# Patient Record
Sex: FEMALE | Race: WHITE | NOT HISPANIC OR LATINO | Employment: OTHER | URBAN - METROPOLITAN AREA
[De-identification: names, ages, dates, MRNs, and addresses within clinical notes are randomized per-mention and may not be internally consistent; named-entity substitution may affect disease eponyms.]

---

## 2018-03-23 LAB
ALBUMIN SERPL BCP-MCNC: 4.3 G/DL (ref 3.5–5.7)
BASOPHILS # BLD AUTO: 0.1 X3/UL (ref 0–0.3)
BASOPHILS # BLD AUTO: 1 % (ref 0–2)
BUN SERPL-MCNC: 23 MG/DL (ref 7–25)
CALCIUM SERPL-MCNC: 10.4 MG/DL (ref 8.6–10.5)
CHLORIDE SERPL-SCNC: 106 MM/L (ref 98–107)
CO2 SERPL-SCNC: 27 MM/L (ref 21–31)
CREAT SERPL-MCNC: 1.13 MG/DL (ref 0.6–1.2)
DEPRECATED RDW RBC AUTO: 15.5 % (ref 11.5–14.5)
EGFR (HISTORICAL): 48 GFR
EGFR AFRICAN AMERICAN (HISTORICAL): 58 GFR
EOSINOPHIL # BLD AUTO: 0.2 X3/UL (ref 0–0.5)
EOSINOPHIL NFR BLD AUTO: 1.9 % (ref 0–5)
GLUCOSE (HISTORICAL): 101 MG/DL (ref 65–99)
HCT VFR BLD AUTO: 37.4 % (ref 37–47)
HGB BLD-MCNC: 12.5 G/DL (ref 12–16)
LYMPHOCYTES # BLD AUTO: 2.5 X3/UL (ref 1.2–4.2)
LYMPHOCYTES NFR BLD AUTO: 22 % (ref 20.5–51.1)
MCH RBC QN AUTO: 32.5 PG (ref 26–34)
MCHC RBC AUTO-ENTMCNC: 33.3 G/DL (ref 31–36)
MCV RBC AUTO: 97.6 FL (ref 81–99)
MONOCYTES # BLD AUTO: 0.7 X3/UL (ref 0–1)
MONOCYTES NFR BLD AUTO: 6 % (ref 1.7–12)
NEUTROPHILS # BLD AUTO: 7.9 X3/UL (ref 1.4–6.5)
NEUTS SEG NFR BLD AUTO: 69.1 % (ref 42.2–75.2)
OSMOLALITY, SERUM (HISTORICAL): 285 MOSM (ref 262–291)
PHOSPHATE SERPL-MCNC: 4.5 MG/DL (ref 3–5.5)
PLATELET # BLD AUTO: 338 X3/UL (ref 130–400)
PMV BLD AUTO: 9 FL (ref 8.6–11.7)
POTASSIUM SERPL-SCNC: 4.5 MM/L (ref 3.5–5.5)
PTH-INTACT SERPL-MCNC: 21 PG/ML (ref 12–88)
RBC # BLD AUTO: 3.83 X6/UL (ref 3.9–5.2)
SODIUM SERPL-SCNC: 141 MM/L (ref 134–143)
URIC ACID (HISTORICAL): 5.4 MG/DL (ref 2.3–7.6)
WBC # BLD AUTO: 11.5 X3/UL (ref 4.8–10.8)

## 2018-12-20 PROCEDURE — 1123F ACP DISCUSS/DSCN MKR DOCD: CPT | Performed by: PATHOLOGY

## 2019-01-15 RX ORDER — LEVOTHYROXINE SODIUM 175 UG/1
175 TABLET ORAL DAILY
COMMUNITY

## 2019-01-15 RX ORDER — MELATONIN
2000 DAILY
COMMUNITY

## 2019-01-15 RX ORDER — BENAZEPRIL HYDROCHLORIDE 40 MG/1
40 TABLET, FILM COATED ORAL 2 TIMES DAILY
COMMUNITY

## 2019-01-15 RX ORDER — ALLOPURINOL 100 MG/1
150 TABLET ORAL
COMMUNITY

## 2019-01-15 RX ORDER — METOPROLOL TARTRATE 50 MG/1
25 TABLET, FILM COATED ORAL EVERY 12 HOURS SCHEDULED
COMMUNITY

## 2019-01-15 RX ORDER — MULTIVIT-MIN/IRON/FOLIC ACID/K 18-600-40
CAPSULE ORAL
COMMUNITY

## 2019-01-15 RX ORDER — RANITIDINE 150 MG/1
150 TABLET ORAL 2 TIMES DAILY
COMMUNITY

## 2019-01-15 RX ORDER — OXYBUTYNIN CHLORIDE 5 MG/1
5 TABLET, EXTENDED RELEASE ORAL 3 TIMES DAILY
COMMUNITY

## 2019-01-16 ENCOUNTER — ANESTHESIA EVENT (OUTPATIENT)
Dept: PERIOP | Facility: HOSPITAL | Age: 69
End: 2019-01-16
Payer: MEDICARE

## 2019-01-16 NOTE — ANESTHESIA PREPROCEDURE EVALUATION
Review of Systems/Medical History  Patient summary reviewed  Chart reviewed  History of anesthetic complications     Cardiovascular  Hypertension ,    Pulmonary  Smoker ex-smoker  ,        GI/Hepatic    GERD ,             Endo/Other  History of thyroid disease ,      GYN       Hematology   Musculoskeletal    Arthritis     Neurology   Psychology         Lab Results   Component Value Date    WBC 11 5 (H) 03/23/2018    HGB 12 5 03/23/2018    HCT 37 4 03/23/2018    MCV 97 6 03/23/2018     03/23/2018     Lab Results   Component Value Date    CALCIUM 10 4 03/23/2018     03/23/2018    K 4 5 03/23/2018    CO2 27 03/23/2018     03/23/2018    BUN 23 03/23/2018    CREATININE 1 13 03/23/2018     No results found for: INR, PROTIME  No results found for: PTT    Physical Exam    Airway    Mallampati score: III  TM Distance: >3 FB  Neck ROM: full     Dental   No notable dental hx upper dentures,     Cardiovascular  Cardiovascular exam normal    Pulmonary  Pulmonary exam normal     Other Findings        Anesthesia Plan  ASA Score- 2     Anesthesia Type- IV sedation with anesthesia with ASA Monitors  Additional Monitors:   Airway Plan:     Comment: Plan discussed is MAC with GA as backup  I personally discussed risks and benefits to this anesthetic  All patient questions were answered  Pt agrees with anesthesia plan  Hx of awareness with prior Coloonoscopy  Plan Factors-    Induction- intravenous  Postoperative Plan- Plan for postoperative opioid use  Informed Consent- Anesthetic plan and risks discussed with patient  I personally reviewed this patient with the CRNA  Discussed and agreed on the Anesthesia Plan with the CRNA  Gege Bowen

## 2019-01-17 ENCOUNTER — HOSPITAL ENCOUNTER (OUTPATIENT)
Facility: HOSPITAL | Age: 69
Setting detail: OUTPATIENT SURGERY
Discharge: HOME | End: 2019-01-17
Attending: INTERNAL MEDICINE | Admitting: INTERNAL MEDICINE
Payer: MEDICARE

## 2019-01-17 ENCOUNTER — ANESTHESIA (OUTPATIENT)
Dept: PERIOP | Facility: HOSPITAL | Age: 69
End: 2019-01-17
Payer: MEDICARE

## 2019-01-17 VITALS
HEART RATE: 82 BPM | BODY MASS INDEX: 34.99 KG/M2 | HEIGHT: 65 IN | DIASTOLIC BLOOD PRESSURE: 59 MMHG | OXYGEN SATURATION: 94 % | SYSTOLIC BLOOD PRESSURE: 103 MMHG | TEMPERATURE: 98.7 F | RESPIRATION RATE: 20 BRPM | WEIGHT: 210 LBS

## 2019-01-17 DIAGNOSIS — K62.5 HEMORRHAGE OF ANUS AND RECTUM: ICD-10-CM

## 2019-01-17 DIAGNOSIS — R19.7 DIARRHEA: ICD-10-CM

## 2019-01-17 PROCEDURE — 88305 TISSUE EXAM BY PATHOLOGIST: CPT | Performed by: PATHOLOGY

## 2019-01-17 RX ORDER — PROPOFOL 10 MG/ML
INJECTION, EMULSION INTRAVENOUS AS NEEDED
Status: DISCONTINUED | OUTPATIENT
Start: 2019-01-17 | End: 2019-01-17 | Stop reason: SURG

## 2019-01-17 RX ORDER — MIDAZOLAM HYDROCHLORIDE 1 MG/ML
INJECTION INTRAMUSCULAR; INTRAVENOUS AS NEEDED
Status: DISCONTINUED | OUTPATIENT
Start: 2019-01-17 | End: 2019-01-17 | Stop reason: SURG

## 2019-01-17 RX ORDER — SODIUM CHLORIDE, SODIUM LACTATE, POTASSIUM CHLORIDE, CALCIUM CHLORIDE 600; 310; 30; 20 MG/100ML; MG/100ML; MG/100ML; MG/100ML
75 INJECTION, SOLUTION INTRAVENOUS CONTINUOUS
Status: DISCONTINUED | OUTPATIENT
Start: 2019-01-17 | End: 2019-01-17 | Stop reason: HOSPADM

## 2019-01-17 RX ORDER — SODIUM CHLORIDE, SODIUM LACTATE, POTASSIUM CHLORIDE, CALCIUM CHLORIDE 600; 310; 30; 20 MG/100ML; MG/100ML; MG/100ML; MG/100ML
INJECTION, SOLUTION INTRAVENOUS CONTINUOUS PRN
Status: DISCONTINUED | OUTPATIENT
Start: 2019-01-17 | End: 2019-01-17

## 2019-01-17 RX ORDER — SODIUM CHLORIDE, SODIUM LACTATE, POTASSIUM CHLORIDE, CALCIUM CHLORIDE 600; 310; 30; 20 MG/100ML; MG/100ML; MG/100ML; MG/100ML
125 INJECTION, SOLUTION INTRAVENOUS CONTINUOUS
Status: DISCONTINUED | OUTPATIENT
Start: 2019-01-17 | End: 2019-01-17 | Stop reason: HOSPADM

## 2019-01-17 RX ADMIN — PROPOFOL 30 MG: 10 INJECTION, EMULSION INTRAVENOUS at 09:47

## 2019-01-17 RX ADMIN — PROPOFOL 50 MG: 10 INJECTION, EMULSION INTRAVENOUS at 09:23

## 2019-01-17 RX ADMIN — PROPOFOL 50 MG: 10 INJECTION, EMULSION INTRAVENOUS at 09:26

## 2019-01-17 RX ADMIN — PROPOFOL 20 MG: 10 INJECTION, EMULSION INTRAVENOUS at 09:43

## 2019-01-17 RX ADMIN — PROPOFOL 30 MG: 10 INJECTION, EMULSION INTRAVENOUS at 09:41

## 2019-01-17 RX ADMIN — PROPOFOL 20 MG: 10 INJECTION, EMULSION INTRAVENOUS at 09:32

## 2019-01-17 RX ADMIN — PROPOFOL 30 MG: 10 INJECTION, EMULSION INTRAVENOUS at 09:46

## 2019-01-17 RX ADMIN — SODIUM CHLORIDE, SODIUM LACTATE, POTASSIUM CHLORIDE, AND CALCIUM CHLORIDE: .6; .31; .03; .02 INJECTION, SOLUTION INTRAVENOUS at 08:27

## 2019-01-17 RX ADMIN — PROPOFOL 40 MG: 10 INJECTION, EMULSION INTRAVENOUS at 09:29

## 2019-01-17 RX ADMIN — PROPOFOL 20 MG: 10 INJECTION, EMULSION INTRAVENOUS at 09:39

## 2019-01-17 RX ADMIN — PROPOFOL 150 MG: 10 INJECTION, EMULSION INTRAVENOUS at 09:21

## 2019-01-17 RX ADMIN — PROPOFOL 40 MG: 10 INJECTION, EMULSION INTRAVENOUS at 09:31

## 2019-01-17 RX ADMIN — PROPOFOL 30 MG: 10 INJECTION, EMULSION INTRAVENOUS at 09:45

## 2019-01-17 RX ADMIN — MIDAZOLAM HYDROCHLORIDE 2 MG: 1 INJECTION, SOLUTION INTRAMUSCULAR; INTRAVENOUS at 09:21

## 2019-01-17 RX ADMIN — PROPOFOL 30 MG: 10 INJECTION, EMULSION INTRAVENOUS at 09:36

## 2019-01-17 RX ADMIN — PROPOFOL 20 MG: 10 INJECTION, EMULSION INTRAVENOUS at 09:34

## 2019-01-17 NOTE — ANESTHESIA POSTPROCEDURE EVALUATION
Post-Op Assessment Note      CV Status:  Stable    Mental Status:  Alert    Hydration Status:  Stable    PONV Controlled:  Controlled    Airway Patency:  Patent    Post Op Vitals Reviewed: Yes          Staff: CRNA           BP  103/59   Temp   98 7   Pulse  67   Resp   20   SpO2   93

## 2019-01-17 NOTE — OP NOTE
OPERATIVE REPORT  PATIENT NAME: Sandra Quiroz    :  1950  MRN: [id number]  Pt Location: [address] [place] [address] GI ROOM 01    SURGERY DATE: 2019    Surgeon(s) and Role:     * Arsenio Cortes MD - Primary    Preop Diagnosis:  Diarrhea [R19 7]  Hemorrhage of anus and rectum [K62 5]    Post-Op Diagnosis Codes:     * Diarrhea [R19 7]     * Hemorrhage of anus and rectum [K62 5]    Procedure(s) (LRB):   colonoscopy with multple polpectomies (N/A)    Specimen(s):  ID Type Source Tests Collected by Time Destination   1 : polyp Tissue Large Intestine, Cecum TISSUE EXAM Arsenio Cortes MD 2019 9289    2 : polyp Tissue Large Intestine, Left/Descending Colon TISSUE Marleni Dm Arsenio Cortes MD 2019 0944    3 : poylp Tissue Large Intestine, Sigmoid Colon TISSUE EXAM Arsenio Cortes MD 2019 3997        Estimated Blood Loss:   Minimal    Drains:       Anesthesia Type:   IV Sedation with Anesthesia    Operative Indications:  Diarrhea [R19 7]  Hemorrhage of anus and rectum [K62 5]  History of colonic polyps  Operative Findings: Three sessile and small colonic polyps ablated removed by hot biopsy forceps  One from cecum, 1 from descending colon, 1 from sigmoid colon  Diverticulosis without diverticulitis  Internal hemorrhoids seen without bleeding  Complications:   None    Procedure and Technique:  Patient was identified in the endoscopy suite  Informed consent was obtained he was placed in the left lateral position and a PC of 180 scope was used to examine the entire colon up to cecum  Entire colon was examined thoroughly upon insertion and removal of the scope  Colonoscopy withdrawal time was 14 min from the cecum  Prep was adequate  Patient tolerated procedure well there were no complications  Findings  One cecal polyp sessile, 1 descending colon polyp sessile, 1 flat polyp from sigmoid colon were all ablated removed by hot biopsy  Forceps  Diverticulosis without diverticulitis    Internal hemorrhoids seen without  Bleeding  Recommendations  Check pathology  Local treatment of hemorrhoids  Repeat colonoscopy in 3-5 years unless symptomatic     I was present for the entire procedure    Patient Disposition:  APU    SIGNATURE: Wan Ha MD  DATE: January 17, 2019  TIME: 9:50 AM

## 2019-01-24 ENCOUNTER — TRANSCRIBE ORDERS (OUTPATIENT)
Dept: ADMINISTRATIVE | Facility: HOSPITAL | Age: 69
End: 2019-01-24

## 2019-01-24 DIAGNOSIS — Z12.39 BREAST SCREENING: Primary | ICD-10-CM

## 2019-02-07 ENCOUNTER — HOSPITAL ENCOUNTER (OUTPATIENT)
Dept: MAMMOGRAPHY | Facility: HOSPITAL | Age: 69
Discharge: HOME | End: 2019-02-07
Attending: SPECIALIST
Payer: MEDICARE

## 2019-02-07 VITALS — WEIGHT: 205 LBS | BODY MASS INDEX: 34.16 KG/M2 | HEIGHT: 65 IN

## 2019-02-07 DIAGNOSIS — Z12.39 BREAST SCREENING: ICD-10-CM

## 2019-02-07 PROCEDURE — 77067 SCR MAMMO BI INCL CAD: CPT

## 2019-02-07 PROCEDURE — 77063 BREAST TOMOSYNTHESIS BI: CPT

## 2020-03-13 ENCOUNTER — TRANSCRIBE ORDERS (OUTPATIENT)
Dept: ADMINISTRATIVE | Facility: HOSPITAL | Age: 70
End: 2020-03-13

## 2020-03-13 DIAGNOSIS — Z12.31 ENCOUNTER FOR SCREENING MAMMOGRAM FOR MALIGNANT NEOPLASM OF BREAST: Primary | ICD-10-CM

## 2020-03-17 ENCOUNTER — HOSPITAL ENCOUNTER (OUTPATIENT)
Dept: MAMMOGRAPHY | Facility: HOSPITAL | Age: 70
Discharge: HOME | End: 2020-03-17
Attending: SPECIALIST
Payer: MEDICARE

## 2020-03-17 VITALS — BODY MASS INDEX: 34.49 KG/M2 | WEIGHT: 207 LBS | HEIGHT: 65 IN

## 2020-03-17 DIAGNOSIS — Z12.31 ENCOUNTER FOR SCREENING MAMMOGRAM FOR MALIGNANT NEOPLASM OF BREAST: ICD-10-CM

## 2020-03-17 PROCEDURE — 77063 BREAST TOMOSYNTHESIS BI: CPT

## 2020-03-17 PROCEDURE — 77067 SCR MAMMO BI INCL CAD: CPT

## 2022-05-05 ENCOUNTER — HOSPITAL ENCOUNTER (EMERGENCY)
Facility: HOSPITAL | Age: 72
Discharge: HOME | End: 2022-05-06
Attending: EMERGENCY MEDICINE | Admitting: EMERGENCY MEDICINE
Payer: MEDICARE

## 2022-05-05 ENCOUNTER — APPOINTMENT (EMERGENCY)
Dept: RADIOLOGY | Facility: HOSPITAL | Age: 72
End: 2022-05-05
Payer: MEDICARE

## 2022-05-05 DIAGNOSIS — U07.1 COVID-19: Primary | ICD-10-CM

## 2022-05-05 LAB
ALBUMIN SERPL BCP-MCNC: 3.9 G/DL (ref 3.5–5)
ALP SERPL-CCNC: 51 U/L (ref 34–104)
ALT SERPL W P-5'-P-CCNC: 10 U/L (ref 7–52)
ANION GAP SERPL CALCULATED.3IONS-SCNC: 11 MMOL/L (ref 4–13)
AST SERPL W P-5'-P-CCNC: 28 U/L (ref 13–39)
BASOPHILS # BLD AUTO: 0.05 THOUSANDS/ΜL (ref 0–0.1)
BASOPHILS NFR BLD AUTO: 1 % (ref 0–1)
BILIRUB SERPL-MCNC: 0.46 MG/DL (ref 0.2–1)
BUN SERPL-MCNC: 36 MG/DL (ref 5–25)
CALCIUM SERPL-MCNC: 10.1 MG/DL (ref 8.4–10.2)
CARDIAC TROPONIN I PNL SERPL HS: 7 NG/L
CHLORIDE SERPL-SCNC: 101 MMOL/L (ref 96–108)
CO2 SERPL-SCNC: 24 MMOL/L (ref 21–32)
CREAT SERPL-MCNC: 1.55 MG/DL (ref 0.6–1.3)
EOSINOPHIL # BLD AUTO: 0.11 THOUSAND/ΜL (ref 0–0.61)
EOSINOPHIL NFR BLD AUTO: 2 % (ref 0–6)
ERYTHROCYTE [DISTWIDTH] IN BLOOD BY AUTOMATED COUNT: 13.5 % (ref 11.6–15.1)
FLUAV RNA RESP QL NAA+PROBE: NEGATIVE
FLUBV RNA RESP QL NAA+PROBE: NEGATIVE
GFR SERPL CREATININE-BSD FRML MDRD: 32 ML/MIN/1.73SQ M
GLUCOSE SERPL-MCNC: 109 MG/DL (ref 65–140)
HCT VFR BLD AUTO: 41 % (ref 34.8–46.1)
HGB BLD-MCNC: 13 G/DL (ref 11.5–15.4)
IMM GRANULOCYTES # BLD AUTO: 0.02 THOUSAND/UL (ref 0–0.2)
IMM GRANULOCYTES NFR BLD AUTO: 0 % (ref 0–2)
LYMPHOCYTES # BLD AUTO: 1.51 THOUSANDS/ΜL (ref 0.6–4.47)
LYMPHOCYTES NFR BLD AUTO: 23 % (ref 14–44)
MCH RBC QN AUTO: 31.3 PG (ref 26.8–34.3)
MCHC RBC AUTO-ENTMCNC: 31.7 G/DL (ref 31.4–37.4)
MCV RBC AUTO: 99 FL (ref 82–98)
MONOCYTES # BLD AUTO: 0.71 THOUSAND/ΜL (ref 0.17–1.22)
MONOCYTES NFR BLD AUTO: 11 % (ref 4–12)
NEUTROPHILS # BLD AUTO: 4.2 THOUSANDS/ΜL (ref 1.85–7.62)
NEUTS SEG NFR BLD AUTO: 63 % (ref 43–75)
NRBC BLD AUTO-RTO: 0 /100 WBCS
PLATELET # BLD AUTO: 206 THOUSANDS/UL (ref 149–390)
PMV BLD AUTO: 9.7 FL (ref 8.9–12.7)
POTASSIUM SERPL-SCNC: 4 MMOL/L (ref 3.5–5.3)
PROT SERPL-MCNC: 7.4 G/DL (ref 6.4–8.4)
RBC # BLD AUTO: 4.16 MILLION/UL (ref 3.81–5.12)
RSV RNA RESP QL NAA+PROBE: NEGATIVE
SARS-COV-2 RNA RESP QL NAA+PROBE: POSITIVE
SODIUM SERPL-SCNC: 136 MMOL/L (ref 135–147)
WBC # BLD AUTO: 6.6 THOUSAND/UL (ref 4.31–10.16)

## 2022-05-05 PROCEDURE — 36415 COLL VENOUS BLD VENIPUNCTURE: CPT | Performed by: EMERGENCY MEDICINE

## 2022-05-05 PROCEDURE — 0241U HB NFCT DS VIR RESP RNA 4 TRGT: CPT | Performed by: EMERGENCY MEDICINE

## 2022-05-05 PROCEDURE — 93005 ELECTROCARDIOGRAM TRACING: CPT

## 2022-05-05 PROCEDURE — 80053 COMPREHEN METABOLIC PANEL: CPT | Performed by: EMERGENCY MEDICINE

## 2022-05-05 PROCEDURE — 96361 HYDRATE IV INFUSION ADD-ON: CPT

## 2022-05-05 PROCEDURE — 71045 X-RAY EXAM CHEST 1 VIEW: CPT

## 2022-05-05 PROCEDURE — 96360 HYDRATION IV INFUSION INIT: CPT

## 2022-05-05 PROCEDURE — 85025 COMPLETE CBC W/AUTO DIFF WBC: CPT | Performed by: EMERGENCY MEDICINE

## 2022-05-05 PROCEDURE — 99284 EMERGENCY DEPT VISIT MOD MDM: CPT

## 2022-05-05 PROCEDURE — 84484 ASSAY OF TROPONIN QUANT: CPT | Performed by: EMERGENCY MEDICINE

## 2022-05-05 RX ORDER — SODIUM CHLORIDE 9 MG/ML
3 INJECTION INTRAVENOUS
Status: DISCONTINUED | OUTPATIENT
Start: 2022-05-05 | End: 2022-05-06 | Stop reason: HOSPADM

## 2022-05-05 RX ORDER — ACETAMINOPHEN 325 MG/1
975 TABLET ORAL ONCE
Status: COMPLETED | OUTPATIENT
Start: 2022-05-05 | End: 2022-05-05

## 2022-05-05 RX ADMIN — ACETAMINOPHEN 975 MG: 325 TABLET ORAL at 22:49

## 2022-05-05 RX ADMIN — SODIUM CHLORIDE 1000 ML: 0.9 INJECTION, SOLUTION INTRAVENOUS at 22:49

## 2022-05-06 VITALS
RESPIRATION RATE: 16 BRPM | SYSTOLIC BLOOD PRESSURE: 109 MMHG | DIASTOLIC BLOOD PRESSURE: 54 MMHG | HEART RATE: 67 BPM | OXYGEN SATURATION: 93 % | TEMPERATURE: 98.1 F

## 2022-05-06 LAB
2HR DELTA HS TROPONIN: -1 NG/L
ANION GAP SERPL CALCULATED.3IONS-SCNC: 9 MMOL/L (ref 4–13)
ATRIAL RATE: 71 BPM
ATRIAL RATE: 72 BPM
BUN SERPL-MCNC: 36 MG/DL (ref 5–25)
CALCIUM SERPL-MCNC: 9.4 MG/DL (ref 8.4–10.2)
CARDIAC TROPONIN I PNL SERPL HS: 6 NG/L
CHLORIDE SERPL-SCNC: 103 MMOL/L (ref 96–108)
CO2 SERPL-SCNC: 23 MMOL/L (ref 21–32)
CREAT SERPL-MCNC: 1.35 MG/DL (ref 0.6–1.3)
GFR SERPL CREATININE-BSD FRML MDRD: 38 ML/MIN/1.73SQ M
GLUCOSE SERPL-MCNC: 102 MG/DL (ref 65–140)
P AXIS: 64 DEGREES
P AXIS: 65 DEGREES
POTASSIUM SERPL-SCNC: 3.9 MMOL/L (ref 3.5–5.3)
PR INTERVAL: 180 MS
PR INTERVAL: 204 MS
QRS AXIS: 50 DEGREES
QRS AXIS: 55 DEGREES
QRSD INTERVAL: 78 MS
QRSD INTERVAL: 80 MS
QT INTERVAL: 394 MS
QT INTERVAL: 422 MS
QTC INTERVAL: 428 MS
QTC INTERVAL: 462 MS
SODIUM SERPL-SCNC: 135 MMOL/L (ref 135–147)
T WAVE AXIS: 43 DEGREES
T WAVE AXIS: 51 DEGREES
VENTRICULAR RATE: 71 BPM
VENTRICULAR RATE: 72 BPM

## 2022-05-06 PROCEDURE — 84484 ASSAY OF TROPONIN QUANT: CPT | Performed by: EMERGENCY MEDICINE

## 2022-05-06 PROCEDURE — 93010 ELECTROCARDIOGRAM REPORT: CPT | Performed by: INTERNAL MEDICINE

## 2022-05-06 PROCEDURE — 36415 COLL VENOUS BLD VENIPUNCTURE: CPT | Performed by: EMERGENCY MEDICINE

## 2022-05-06 PROCEDURE — 93005 ELECTROCARDIOGRAM TRACING: CPT

## 2022-05-06 PROCEDURE — 80048 BASIC METABOLIC PNL TOTAL CA: CPT | Performed by: EMERGENCY MEDICINE

## 2022-05-06 PROCEDURE — 99282 EMERGENCY DEPT VISIT SF MDM: CPT | Performed by: EMERGENCY MEDICINE

## 2022-05-07 NOTE — ED PROVIDER NOTES
History  Chief Complaint   Patient presents with    Fever - 9 weeks to 74 years     Pt presents ambulatory c/o fevers Tmax 101, lightheadedness, sinus pressure, sore throat x 2 days  Pt endorses recent sick contact w/ COVID+ grandson  Pt took home antigen test which yielded negative result   URI     Patient is a 68-year-old female with history of CKD that presents for evaluation of lightheadedness, cough likely COVID-19  Patient says that her grandson who she presented with just tested positive for COVID-19  She says over the past 48 hours she has had nonproductive cough, rhinorrhea, congestion and intermittent lightheadedness  She took her blood pressure several times a day in the systolics were in the 90Q  Patient has been able to handle p o  she otherwise denies nausea vomiting, chest pain, dyspnea, abdominal pain  Patient with previous history of COVID-19 infection last year  She has not been taking anything for her symptoms  She is unvaccinated  She denies abdominal pain  Symptoms are currently mild and improving  She is actually asymptomatic this time  Prior to Admission Medications   Prescriptions Last Dose Informant Patient Reported? Taking?    COLLAGEN PO   Yes No   Sig: Take by mouth   Docusate Calcium ([employer] STOOL SOFTENER PO)   Yes No   Sig: Take by mouth   Multiple Vitamins-Minerals (MULTI FOR HER 50+) CAPS   Yes No   Sig: Take by mouth   allopurinol (ZYLOPRIM) 100 mg tablet   Yes No   Sig: Take 150 mg by mouth   benazepril (LOTENSIN) 40 MG tablet   Yes No   Sig: Take 40 mg by mouth 2 (two) times a day   carbidopa-levodopa (SINEMET)  mg per tablet   Yes No   Sig: Take 1 tablet by mouth 4 (four) times a day   cholecalciferol (VITAMIN D3) 1,000 units tablet   Yes No   Sig: Take 2,000 Units by mouth daily   levothyroxine 175 mcg tablet   Yes No   Sig: Take 175 mcg by mouth daily   metoprolol tartrate (LOPRESSOR) 50 mg tablet   Yes No   Sig: Take 25 mg by mouth every 12 (twelve) hours   oxybutynin (DITROPAN-XL) 5 mg 24 hr tablet   Yes No   Sig: Take 5 mg by mouth 3 (three) times a day   ranitidine (ZANTAC) 150 mg tablet   Yes No   Sig: Take 150 mg by mouth 2 (two) times a day      Facility-Administered Medications: None       Past Medical History:   Diagnosis Date    Arthritis     Chronic kidney disease     Disease of thyroid gland     GERD (gastroesophageal reflux disease)     Hypertension        Past Surgical History:   Procedure Laterality Date    ABLATION SAPHENOUS VEIN W/ RFA      ALLERGEN BIRCH (HISTORICAL)      COLONOSCOPY N/A 1/17/2019    Procedure:  colonoscopy with multple polpectomies;  Surgeon: Travis Ulloa MD;  Location: [facility] GI LAB; Service: Gastroenterology    HYSTERECTOMY  1985    JOINT REPLACEMENT      ROTATOR CUFF REPAIR      SALPINGOOPHORECTOMY Left     THYROID LOBECTOMY      TOTAL HIP ARTHROPLASTY Bilateral        Family History   Problem Relation Age of Onset    Heart disease Mother     Heart disease Father     Breast cancer Sister 50    No Known Problems Daughter     No Known Problems Maternal Grandmother     No Known Problems Paternal Grandmother     No Known Problems Paternal Aunt      I have reviewed and agree with the history as documented  E-Cigarette/Vaping     E-Cigarette/Vaping Substances     Social History     Tobacco Use    Smoking status: Former Smoker     Quit date: 7/2/2018     Years since quitting: 3 8    Smokeless tobacco: Never Used   Substance Use Topics    Alcohol use: No    Drug use: No       Review of Systems   Constitutional: Negative for fever  HENT: Positive for congestion  Negative for sore throat  Respiratory: Positive for cough  Negative for shortness of breath  Cardiovascular: Negative for chest pain  Gastrointestinal: Negative for abdominal pain  Genitourinary: Negative for dysuria  Musculoskeletal: Negative for back pain  Skin: Negative for rash  Neurological: Positive for light-headedness  Psychiatric/Behavioral: Negative for agitation  All other systems reviewed and are negative  Physical Exam  Physical Exam  Vitals reviewed  Constitutional:       General: She is not in acute distress  Appearance: She is well-developed  HENT:      Head: Normocephalic  Eyes:      Pupils: Pupils are equal, round, and reactive to light  Cardiovascular:      Rate and Rhythm: Normal rate and regular rhythm  Heart sounds: Normal heart sounds  Pulmonary:      Effort: Pulmonary effort is normal       Breath sounds: Normal breath sounds  Abdominal:      General: Bowel sounds are normal  There is no distension  Palpations: Abdomen is soft  Tenderness: There is no abdominal tenderness  There is no guarding  Musculoskeletal:         General: No tenderness or deformity  Normal range of motion  Cervical back: Normal range of motion and neck supple  Skin:     General: Skin is warm and dry  Capillary Refill: Capillary refill takes less than 2 seconds  Neurological:      Mental Status: She is alert and oriented to person, place, and time  Cranial Nerves: No cranial nerve deficit  Sensory: No sensory deficit  Psychiatric:         Behavior: Behavior normal          Thought Content:  Thought content normal          Judgment: Judgment normal          Vital Signs  ED Triage Vitals [05/05/22 2226]   Temperature Pulse Respirations Blood Pressure SpO2   98 1 °F (36 7 °C) 73 18 139/67 96 %      Temp Source Heart Rate Source Patient Position - Orthostatic VS BP Location FiO2 (%)   Oral Monitor Sitting Left arm --      Pain Score       No Pain           Vitals:    05/06/22 0115 05/06/22 0130 05/06/22 0145 05/06/22 0200   BP:  109/54     Pulse: 70 69 73 67   Patient Position - Orthostatic VS:             Visual Acuity      ED Medications  Medications   acetaminophen (TYLENOL) tablet 975 mg (975 mg Oral Given 5/5/22 0111)   sodium chloride 0 9 % bolus 1,000 mL (0 mL Intravenous Stopped 5/6/22 0041)       Diagnostic Studies  Results Reviewed     Procedure Component Value Units Date/Time    HS Troponin I 2hr [[id number]]  (Normal) Collected: 05/06/22 0106    Lab Status: Final result Specimen: Blood from Arm, Right Updated: 05/06/22 0136     hs TnI 2hr 6 ng/L      Delta 2hr hsTnI -1 ng/L     Basic metabolic panel [[id number]]  (Abnormal) Collected: 05/06/22 0106    Lab Status: Final result Specimen: Blood from Arm, Right Updated: 05/06/22 0131     Sodium 135 mmol/L      Potassium 3 9 mmol/L      Chloride 103 mmol/L      CO2 23 mmol/L      ANION GAP 9 mmol/L      BUN 36 mg/dL      Creatinine 1 35 mg/dL      Glucose 102 mg/dL      Calcium 9 4 mg/dL      eGFR 38 ml/min/1 73sq m     Narrative:      [facility] guidelines for Chronic Kidney Disease (CKD):     Stage 1 with normal or high GFR (GFR > 90 mL/min/1 73 square meters)    Stage 2 Mild CKD (GFR = 60-89 mL/min/1 73 square meters)    Stage 3A Moderate CKD (GFR = 45-59 mL/min/1 73 square meters)    Stage 3B Moderate CKD (GFR = 30-44 mL/min/1 73 square meters)    Stage 4 Severe CKD (GFR = 15-29 mL/min/1 73 square meters)    Stage 5 End Stage CKD (GFR <15 mL/min/1 73 square meters)  Note: GFR calculation is accurate only with a steady state creatinine    COVID/FLU/RSV [[id number]]  (Abnormal) Collected: 05/05/22 2249    Lab Status: Final result Specimen: Nares from Nose Updated: 05/05/22 2332     SARS-CoV-2 Positive     INFLUENZA A PCR Negative     INFLUENZA B PCR Negative     RSV PCR Negative    Narrative:      FOR PEDIATRIC PATIENTS - copy/paste COVID Guidelines URL to browser: [email or web address]  [email or web address]    SARS-CoV-2 assay is a Nucleic Acid Amplification assay intended for the  qualitative detection of nucleic acid from SARS-CoV-2 in nasopharyngeal  swabs  Results are for the presumptive identification of SARS-CoV-2 RNA      Positive results are indicative of infection with SARS-CoV-2, the virus  causing COVID-19, but do not rule out bacterial infection or co-infection  with other viruses  Laboratories within the [place] and its  territories are required to report all positive results to the appropriate  public health authorities  Negative results do not preclude SARS-CoV-2  infection and should not be used as the sole basis for treatment or other  patient management decisions  Negative results must be combined with  clinical observations, patient history, and epidemiological information  This test has not been FDA cleared or approved  This test has been authorized by FDA under an Emergency Use Authorization  (EUA)  This test is only authorized for the duration of time the  declaration that circumstances exist justifying the authorization of the  emergency use of an in vitro diagnostic tests for detection of SARS-CoV-2  virus and/or diagnosis of COVID-19 infection under section 564(b)(1) of  the Act, 21 U  S C  832ZQH-7(C)(0), unless the authorization is terminated  or revoked sooner  The test has been validated but independent review by FDA  and CLIA is pending  Test performed using [employer] GeneXpert: This RT-PCR assay targets N2,  a region unique to SARS-CoV-2  A conserved region in the E-gene was chosen  for pan-Sarbecovirus detection which includes SARS-CoV-2      HS Troponin 0hr (reflex protocol) [[id number]]  (Normal) Collected: 05/05/22 2249    Lab Status: Final result Specimen: Blood from Arm, Right Updated: 05/05/22 2320     hs TnI 0hr 7 ng/L     Comprehensive metabolic panel [[id number]]  (Abnormal) Collected: 05/05/22 2249    Lab Status: Final result Specimen: Blood from Arm, Right Updated: 05/05/22 2316     Sodium 136 mmol/L      Potassium 4 0 mmol/L      Chloride 101 mmol/L      CO2 24 mmol/L      ANION GAP 11 mmol/L      BUN 36 mg/dL      Creatinine 1 55 mg/dL      Glucose 109 mg/dL      Calcium 10 1 mg/dL      AST 28 U/L      ALT 10 U/L      Alkaline Phosphatase 51 U/L      Total Protein 7 4 g/dL      Albumin 3 9 g/dL      Total Bilirubin 0 46 mg/dL      eGFR 32 ml/min/1 73sq m     Narrative:      [facility] guidelines for Chronic Kidney Disease (CKD):     Stage 1 with normal or high GFR (GFR > 90 mL/min/1 73 square meters)    Stage 2 Mild CKD (GFR = 60-89 mL/min/1 73 square meters)    Stage 3A Moderate CKD (GFR = 45-59 mL/min/1 73 square meters)    Stage 3B Moderate CKD (GFR = 30-44 mL/min/1 73 square meters)    Stage 4 Severe CKD (GFR = 15-29 mL/min/1 73 square meters)    Stage 5 End Stage CKD (GFR <15 mL/min/1 73 square meters)  Note: GFR calculation is accurate only with a steady state creatinine    CBC and differential [[id number]]  (Abnormal) Collected: 05/05/22 2249    Lab Status: Final result Specimen: Blood from Arm, Right Updated: 05/05/22 2255     WBC 6 60 Thousand/uL      RBC 4 16 Million/uL      Hemoglobin 13 0 g/dL      Hematocrit 41 0 %      MCV 99 fL      MCH 31 3 pg      MCHC 31 7 g/dL      RDW 13 5 %      MPV 9 7 fL      Platelets 700 Thousands/uL      nRBC 0 /100 WBCs      Neutrophils Relative 63 %      Immat GRANS % 0 %      Lymphocytes Relative 23 %      Monocytes Relative 11 %      Eosinophils Relative 2 %      Basophils Relative 1 %      Neutrophils Absolute 4 20 Thousands/µL      Immature Grans Absolute 0 02 Thousand/uL      Lymphocytes Absolute 1 51 Thousands/µL      Monocytes Absolute 0 71 Thousand/µL      Eosinophils Absolute 0 11 Thousand/µL      Basophils Absolute 0 05 Thousands/µL                  X-ray chest 1 view portable   Final Result by Moreno Moralez MD (05/06 1013)      Mild right basilar discoid atelectasis  No focal infiltrate                   Workstation performed: [id number]                    Procedures  Procedures         ED Course  ED Course as of 05/07/22 Marija Sports May 06, 2022   0138 Creatinine(!): 1 35  Improved                                SBIRT 20yo+      Most Recent Value SBIRT (25 yo +)    In order to provide better care to our patients, we are screening all of our patients for alcohol and drug use  Would it be okay to ask you these screening questions? No Filed at: 05/05/2022 2257                    [facility]  Number of Diagnoses or Management Options  COVID-19  Diagnosis management comments: Patient is a 70-year-old female presents for evaluation of cough rhinorrhea, congestion, lightheadedness secondary to COVID-19  Patient was normotensive throughout her time here in the emergency department  Noted to have increased creatinine likely secondary to dehydration, after 1 L IV fluids blood work did improve  Patient was advised on vitamin-C/vitamin-D/zinc use and strict return precautions  Disposition  Final diagnoses:   [id number]     Time reflects when diagnosis was documented in both MDM as applicable and the Disposition within this note     Time User Action Codes Description Comment    5/6/2022  1:39 AM Joanna Mad Add [U07 1] COVID-19       ED Disposition     ED Disposition Condition Date/Time Comment    Discharge Stable Fri May 6, 2022  1:39 AM Ligia Doty discharge to home/self care              Follow-up Information     Follow up With Specialties Details Why Contact Info Additional [address] Emergency Department Emergency Medicine  If symptoms worsen [address]  [place] [address] [phone] Replaced by [facility] Emergency Department, [address] [place] [address] Maria Esther Mata, [address] [facility], DO Family Medicine Schedule an appointment as soon as possible for a visit in 3 days For symptom recheck [facility] [address]  [phone]             Discharge Medication List as of 5/6/2022  1:39 AM      CONTINUE these medications which have NOT CHANGED    Details   allopurinol (ZYLOPRIM) 100 mg tablet Take 150 mg by mouth, Historical Med      benazepril (LOTENSIN) 40 MG tablet Take 40 mg by mouth 2 (two) times a day, Historical Med      carbidopa-levodopa (SINEMET)  mg per tablet Take 1 tablet by mouth 4 (four) times a day, Historical Med      cholecalciferol (VITAMIN D3) 1,000 units tablet Take 2,000 Units by mouth daily, Historical Med      COLLAGEN PO Take by mouth, Historical Med      Docusate Calcium ([employer] STOOL SOFTENER PO) Take by mouth, Historical Med      levothyroxine 175 mcg tablet Take 175 mcg by mouth daily, Historical Med      metoprolol tartrate (LOPRESSOR) 50 mg tablet Take 25 mg by mouth every 12 (twelve) hours, Historical Med      Multiple Vitamins-Minerals (MULTI FOR HER 50+) CAPS Take by mouth, Historical Med      oxybutynin (DITROPAN-XL) 5 mg 24 hr tablet Take 5 mg by mouth 3 (three) times a day, Historical Med      ranitidine (ZANTAC) 150 mg tablet Take 150 mg by mouth 2 (two) times a day, Historical Med             No discharge procedures on file      PDMP Review     None          ED Provider  Electronically Signed by           Lor Ceballos MD  05/07/22 0138

## 2025-02-27 ENCOUNTER — APPOINTMENT (OUTPATIENT)
Dept: RADIOLOGY | Facility: CLINIC | Age: 75
End: 2025-02-27
Payer: MEDICARE

## 2025-02-27 ENCOUNTER — OFFICE VISIT (OUTPATIENT)
Dept: URGENT CARE | Facility: CLINIC | Age: 75
End: 2025-02-27
Payer: MEDICARE

## 2025-02-27 VITALS
TEMPERATURE: 98.5 F | DIASTOLIC BLOOD PRESSURE: 65 MMHG | OXYGEN SATURATION: 98 % | SYSTOLIC BLOOD PRESSURE: 135 MMHG | HEART RATE: 86 BPM | RESPIRATION RATE: 18 BRPM

## 2025-02-27 DIAGNOSIS — M79.645 PAIN OF LEFT THUMB: Primary | ICD-10-CM

## 2025-02-27 DIAGNOSIS — M79.645 PAIN OF LEFT THUMB: ICD-10-CM

## 2025-02-27 PROCEDURE — 99204 OFFICE O/P NEW MOD 45 MIN: CPT

## 2025-02-27 PROCEDURE — 73140 X-RAY EXAM OF FINGER(S): CPT

## 2025-02-27 PROCEDURE — G0463 HOSPITAL OUTPT CLINIC VISIT: HCPCS

## 2025-02-27 NOTE — PROGRESS NOTES
"  [facility]        NAME: Brittany Webber is a 74 y.o. female  : 1950    MRN: [id number]  DATE: 2025  TIME: 3:43 PM    Assessment and Plan   Pain of left thumb [M79.645]  1. Pain of left thumb  XR thumb left first digit-min 2v    Ambulatory Referral to Orthopedic Surgery        Initial xray: negative for any acute osseous abnormality  Instructed patient that her symptoms are consistent with arthritis. Patient requested a finger splint to immobilizer her thumb. Educated patient on conservative management of her pain. Patient referred to orthopedics for further management. She understands and agrees with treatment plan.    Patient Instructions   Tylenol/Ibuprofen as needed for pain  Rest and Elevate  Ice 20 minutes 3-4 times per day for 3 days  Insulate the skin from the ice to prevent frostbite  Follow up with orthopedic if symptoms do not improve     Wear brace, splint or ACE wrap +/- crutches for support (Remove braces and ACE bandages every 3 hours)  Wear shoe arch support for foot injuries (ex: superfeet insoles)       Follow up with PCP in 3-5 days.  Proceed to  ER if symptoms worsen.    If tests have been performed at [facility], our office will contact you with results if changes need to be made to the care plan discussed with you at the visit.  You can review your full results on [facility] MyChart.    Chief Complaint     Chief Complaint   Patient presents with    Thumb Injury     Left thumb pain hurts no recall of injury          History of Present Illness       74-year-old female with past medical history of osteoarthritis and gout reporting 3 weeks of left thumb pain.  Her pain is located both at the metacarpophalangeal joint and the intra phalangeal joint and is provoked by thumb flexion.  She cannot recall any injuries to her thumb.  She notes that when she flexes her thumb it \"clicks horribly\" but feels different than her arthritis in her knees and back.  She denies any known " autoimmune diseases.  She denies fevers, weakness, numbness, tingling.        Review of Systems   Review of Systems   Constitutional:  Negative for chills and fever.   HENT:  Negative for ear pain and sore throat.    Eyes:  Negative for pain and visual disturbance.   Respiratory:  Negative for cough and shortness of breath.    Cardiovascular:  Negative for chest pain and palpitations.   Gastrointestinal:  Negative for abdominal pain and vomiting.   Genitourinary:  Negative for dysuria and hematuria.   Musculoskeletal:  Positive for arthralgias and joint swelling. Negative for back pain.   Skin:  Negative for color change and rash.   Neurological:  Negative for seizures, syncope, weakness and numbness.   All other systems reviewed and are negative.        Current Medications       Current Outpatient Medications:     allopurinol (ZYLOPRIM) 100 mg tablet, Take 150 mg by mouth, Disp: , Rfl:     benazepril (LOTENSIN) 40 MG tablet, Take 40 mg by mouth 2 (two) times a day, Disp: , Rfl:     carbidopa-levodopa (SINEMET)  mg per tablet, Take 1 tablet by mouth 4 (four) times a day, Disp: , Rfl:     cholecalciferol (VITAMIN D3) 1,000 units tablet, Take 2,000 Units by mouth daily, Disp: , Rfl:     COLLAGEN PO, Take by mouth, Disp: , Rfl:     Docusate Calcium ([employer] STOOL SOFTENER PO), Take by mouth, Disp: , Rfl:     levothyroxine 175 mcg tablet, Take 175 mcg by mouth daily, Disp: , Rfl:     metoprolol tartrate (LOPRESSOR) 50 mg tablet, Take 25 mg by mouth every 12 (twelve) hours, Disp: , Rfl:     Multiple Vitamins-Minerals (MULTI FOR HER 50+) CAPS, Take by mouth, Disp: , Rfl:     oxybutynin (DITROPAN-XL) 5 mg 24 hr tablet, Take 5 mg by mouth 3 (three) times a day, Disp: , Rfl:     ranitidine (ZANTAC) 150 mg tablet, Take 150 mg by mouth 2 (two) times a day, Disp: , Rfl:     Current Allergies     Allergies as of 02/27/2025    (No Known Allergies)            The following portions of the patient's history were reviewed and  updated as appropriate: allergies, current medications, past family history, past medical history, past social history, past surgical history and problem list.     Past Medical History:   Diagnosis Date    Arthritis     Chronic kidney disease     Disease of thyroid gland     GERD (gastroesophageal reflux disease)     Hypertension        Past Surgical History:   Procedure Laterality Date    ABLATION SAPHENOUS VEIN W/ RFA      ALLERGEN BIRCH (HISTORICAL)      COLONOSCOPY N/A 1/17/2019    Procedure:  colonoscopy with multple polpectomies;  Surgeon: Checo Lai MD;  Location:  GI LAB;  Service: Gastroenterology    HYSTERECTOMY  1985    JOINT REPLACEMENT      ROTATOR CUFF REPAIR      SALPINGOOPHORECTOMY Left     THYROID LOBECTOMY      TOTAL HIP ARTHROPLASTY Bilateral        Family History   Problem Relation Age of Onset    Heart disease Mother     Heart disease Father     Breast cancer Sister 48    No Known Problems Daughter     No Known Problems Maternal Grandmother     No Known Problems Paternal Grandmother     No Known Problems Paternal Aunt          Medications have been verified.        Objective   /65   Pulse 86   Temp 98.5 °F (36.9 °C)   Resp 18   SpO2 98%   No LMP recorded. Patient has had a hysterectomy.       Physical Exam     Physical Exam  Constitutional:       General: She is not in acute distress.     Appearance: Normal appearance. She is normal weight. She is not ill-appearing.   HENT:      Head: Normocephalic and atraumatic.      Nose: Nose normal.      Mouth/Throat:      Mouth: Mucous membranes are moist.   Eyes:      Pupils: Pupils are equal, round, and reactive to light.   Cardiovascular:      Rate and Rhythm: Normal rate and regular rhythm.      Pulses: Normal pulses.      Heart sounds: Normal heart sounds.   Pulmonary:      Effort: Pulmonary effort is normal.      Breath sounds: Normal breath sounds.   Abdominal:      General: Abdomen is flat.      Palpations: Abdomen is soft.    Musculoskeletal:         General: Swelling (left thumb MCP, IP) present. No tenderness, deformity or signs of injury.      Cervical back: Normal range of motion.   Skin:     General: Skin is warm and dry.      Capillary Refill: Capillary refill takes less than 2 seconds.   Neurological:      General: No focal deficit present.      Mental Status: She is alert and oriented to person, place, and time.

## 2025-02-27 NOTE — PATIENT INSTRUCTIONS
Tylenol/Ibuprofen as needed for pain  Rest and Elevate  Ice 20 minutes 3-4 times per day for 3 days  Insulate the skin from the ice to prevent frostbite  Follow up with orthopedic if symptoms do not improve     Wear brace, splint or ACE wrap +/- crutches for support (Remove braces and ACE bandages every 3 hours)  Wear shoe arch support for foot injuries (ex: superfeet insoles)    
no

## (undated) DEVICE — GROUNDING PAD UNIVERSAL SLW

## (undated) DEVICE — THE AQUASHIELD SYSTEM IS INTENDED TO BE USED WITH AN AIR SOURCE FROM AN ENDOSCOPE WITH THE PURPOSE OF SUPPLYING STERILE WATER TO THE ENDOSCOPE DURING ENDOSCOPIC PROCEDURES.: Brand: AQUASHIELD

## (undated) DEVICE — TRANSPOSAL ULTRAFLEX DUO/QUAD ULTRA CART MANIFOLD

## (undated) DEVICE — THE TORRENT IRRIGATION TUBING IS INTENDED TO PROVIDE IRRIGATION VIA IRRIGATION FLUIDS, SUCH AS STERILE WATER, DURING GASTROINTESTINAL ENDOSCOPIC PROCEDURES WHEN USED IN CONJUNCTION WITH AN IRRIGATION PUMP OR ELECTROSURGICAL UNIT.: Brand: TORRENT

## (undated) DEVICE — THE TORRENT IRRIGATION SCOPE CONNECTOR IS USED WITH THE TORRENT IRRIGATION TUBING TO PROVIDE IRRIGATION FLUIDS SUCH AS STERILE WATER DURING GASTROINTESTINAL ENDOSCOPIC PROCEDURES WHEN USED IN CONJUNCTION WITH AN IRRIGATION PUMP (OR ELECTROSURGICAL UNIT).: Brand: TORRENT

## (undated) DEVICE — KIT ENDOSCOPY BASIC

## (undated) DEVICE — THE SINGLE USE BIOGUARD AIR WATER VALVE, OLYMPUS IS USED TO CONTROL THE AIR WATER FUNCTION OF AN ENDOSCOPE DURING GI ENDOSCOPIC PROCEDURES.: Brand: BIOGUARD

## (undated) DEVICE — THE BIOSHIELD BIOPSY VALVE - STERILE IS USED TO COVER THE OPENING TO THE BIOPSY/SUCTION CHANNEL INLET OF A GASTROINTESTINAL ENDOSCOPE. IT PROVIDES ACCESS FOR ENDOSCOPIC DEVICE PASSAGE AND EXCHANGE, HELPS MAINTAIN INSUFFLATION AND MINIMIZES LEAKAGE OF BIOMATERIAL FROM THE BIOPSY PORT THROUGHOUT THE GASTROINTESTINAL ENDOSCOPIC PROCEDURE.: Brand: BIOSHIELD

## (undated) DEVICE — DISPOSABLE HOT BIOPSY FORCEPS: Brand: DISPOSABLE HOT BIOPSY FORCEPS